# Patient Record
Sex: FEMALE | Employment: FULL TIME
[De-identification: names, ages, dates, MRNs, and addresses within clinical notes are randomized per-mention and may not be internally consistent; named-entity substitution may affect disease eponyms.]

---

## 2023-09-07 ENCOUNTER — TRANSCRIBE ORDERS (OUTPATIENT)
Dept: OTHER | Age: 43
End: 2023-09-07

## 2023-09-07 DIAGNOSIS — R42 DIZZINESS AND GIDDINESS: Primary | ICD-10-CM

## 2023-09-12 ENCOUNTER — THERAPY VISIT (OUTPATIENT)
Dept: OCCUPATIONAL THERAPY | Facility: REHABILITATION | Age: 43
End: 2023-09-12
Payer: COMMERCIAL

## 2023-09-12 DIAGNOSIS — R42 DIZZINESS: Primary | ICD-10-CM

## 2023-09-12 DIAGNOSIS — Z78.9 IMPAIRED INSTRUMENTAL ACTIVITIES OF DAILY LIVING: ICD-10-CM

## 2023-09-12 PROCEDURE — 97165 OT EVAL LOW COMPLEX 30 MIN: CPT | Mod: GO | Performed by: OCCUPATIONAL THERAPIST

## 2023-09-12 PROCEDURE — 97112 NEUROMUSCULAR REEDUCATION: CPT | Mod: GO | Performed by: OCCUPATIONAL THERAPIST

## 2023-09-12 NOTE — PROGRESS NOTES
"OCCUPATIONAL THERAPY EVALUATION  Type of Visit: Evaluation    See electronic medical record for Abuse and Falls Screening details.    Presenting condition or subjective complaint: Patient was referred to vestibular rehab by ENT provider due to dizziness. Symptoms began in early August after she arrived in [place] after an 8 hour flight.  Symptoms are not improving. She had normal hearing tests, no tinnitus, no nausea and no ear pain. She states that she can hear an echo in her ears when she is talking. Recently she began having headaches at the end of the day.   Date of onset: 08/02/23    Relevant medical history: Dizziness, KIMI  Dates & types of surgery: N/A    Prior diagnostic imaging/testing results:      None  Prior therapy history for the same diagnosis, illness or injury: No      Living Environment  Social support: With a significant other or spouse   Type of home: House   Stairs to enter the home: Yes 5 Is there a railing: Yes   Ramp: No   Stairs inside the home: Yes 11 Is there a railing: Yes   Help at home: None  Equipment owned:       Employment: Yes   Hobbies/Interests: Meditation, yoga, cooking    Patient goals for therapy: Recalibrate my system so it knows when I am not in motion    Pain assessment: Pain present, headache 6/10     Objective     VISUAL SKILLS  Visual Acuity: No deficits identified, Wears glasses    SENSATION: back of head feels numb and bridge of nose feels numb    FUNCTIONAL MOBILITY: \"feels like I'm on a boat\"       VESTIBULAR EVALUATION  ADDITIONAL HISTORY:  Description of symptoms: Off balance; Light-headedness  Dizzy attacks:   Start: Around August 2nd   Last attack: Happens daily   Frequency of occurrences: Almost constant   Length of attack: See above  Difficulty hearing:    Noise in ears? No    Alleviates symptoms: Staying calm  Worsens symptoms: Too much stimulation of the senses  Activities that bring on symptoms: Other  None of these make them better or " worse- it happens all of the time    Oculomotor Screen    Ocular ROM Normal   Smooth Pursuit Normal   Saccades Normal   VOR Abnormal   VOR Cancellation NT   Head Impulse Test NT   Convergence Testing NT        Infrared Goggle Exam Vestibular Suppressant in Last 24 Hours? No  Exam Completed With: Infrared goggles   Spontaneous Nystagmus Negative   Gaze Evoked Nystagmus Negative   Head Shake Horizontal Nystagmus Negative    Left Right   Organ-Hallpike NT NT\   Sidelying Test NT NT        Assessment & Plan   CLINICAL IMPRESSIONS  Medical Diagnosis: dizziness    Treatment Diagnosis: (P) dizziness, decreased ADL and IADL function    Impression/Assessment: Pt is a 42 year old female presenting to Occupational Therapy due to dizziness.  The following significant findings have been identified: Impaired activity tolerance.  These identified deficits interfere with their ability to perform self care tasks, work tasks, and recreational activities as compared to previous level of function.     Clinical Decision Making (Complexity):  Assessment of Occupational Performance: 3-5 Performance Deficits  Occupational Performance Limitations: functional mobility, driving and community mobility, home establishment and management, work, and leisure activities  Clinical Decision Making (Complexity): Low complexity    PLAN OF CARE  Treatment Interventions:  Interventions: Neuromuscular Re-education    Long Term Goals   OT Goal 1  Goal Description: (P) Patient will report improved sense of stability during ADL and IADL's  Target Date: (P) 12/05/23      Frequency of Treatment: (P) once a week  Duration of Treatment: (P) 12 weeks     Recommended Referrals to Other Professionals: No  Education Assessment:       Risks and benefits of evaluation/treatment have been explained.   Patient/Family agrees with Plan of Care.     Evaluation Time:    OT Eval, Low Complexity Minutes (10937): (P) 30       Signing Clinician: Luci Khan  OT

## 2023-10-12 ENCOUNTER — THERAPY VISIT (OUTPATIENT)
Dept: OCCUPATIONAL THERAPY | Facility: REHABILITATION | Age: 43
End: 2023-10-12
Payer: COMMERCIAL

## 2023-10-12 DIAGNOSIS — R42 DIZZINESS: Primary | ICD-10-CM

## 2023-10-12 DIAGNOSIS — Z78.9 IMPAIRED INSTRUMENTAL ACTIVITIES OF DAILY LIVING: ICD-10-CM

## 2023-10-12 PROCEDURE — 97112 NEUROMUSCULAR REEDUCATION: CPT | Mod: GO | Performed by: OCCUPATIONAL THERAPIST

## 2023-10-19 ENCOUNTER — TRANSCRIBE ORDERS (OUTPATIENT)
Dept: OTHER | Age: 43
End: 2023-10-19

## 2023-10-19 DIAGNOSIS — R42 DIZZINESS: Primary | ICD-10-CM

## 2023-10-22 ENCOUNTER — HEALTH MAINTENANCE LETTER (OUTPATIENT)
Age: 43
End: 2023-10-22

## 2023-10-23 ENCOUNTER — THERAPY VISIT (OUTPATIENT)
Dept: PHYSICAL THERAPY | Facility: REHABILITATION | Age: 43
End: 2023-10-23
Payer: COMMERCIAL

## 2023-10-23 DIAGNOSIS — R42 DIZZINESS: ICD-10-CM

## 2023-10-23 DIAGNOSIS — G44.219 EPISODIC TENSION-TYPE HEADACHE, NOT INTRACTABLE: ICD-10-CM

## 2023-10-23 DIAGNOSIS — M79.18 MYOFASCIAL PAIN: Primary | ICD-10-CM

## 2023-10-23 DIAGNOSIS — M54.2 CERVICAL PAIN: ICD-10-CM

## 2023-10-23 PROCEDURE — 97140 MANUAL THERAPY 1/> REGIONS: CPT | Mod: GP | Performed by: PHYSICAL THERAPIST

## 2023-10-23 PROCEDURE — 97161 PT EVAL LOW COMPLEX 20 MIN: CPT | Mod: GP | Performed by: PHYSICAL THERAPIST

## 2023-10-23 NOTE — PROGRESS NOTES
"PHYSICAL THERAPY EVALUATION  Type of Visit: Evaluation    See electronic medical record for Abuse and Falls Screening details.    Subjective       Presenting condition or subjective complaint: Pt comes to PT brant a feeling like she is riding on a boat up and down on 8/2/23. She also started noticing about a month ago having HA's starting. The HA\"s are mostly back of R side of the head and wraps around to the front. Sometimes it is both sides but always R side. She does feel like the sx are better when stepping away from everything after even 10-15\". Prior to the start of her sx she did do a long trip (8 hour flight). She does feel like the sx almost did start in her legs as well. After getting good sleep she would improve from past expericneces like that.  She has also had some emotional things going on with an injury to her . 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 She is getting HA's 1-3x/day and usually more in the afternoon. There is neck pain that she feels is tightness in her chest/shoulders. She did have a concussion about 7 years ago as well. She was able to improve sx with PT/chiro at that time.  Date of onset: 08/02/23    Relevant medical history: Concussions; Dizziness; Migraines or headaches; Neck injury   Dates & types of surgery:      Prior diagnostic imaging/testing results: MRI     Prior therapy history for the same diagnosis, illness or injury:        Living Environment  Social support: With a significant other or spouse   Type of home: House   Stairs to enter the home: Yes       Ramp:     Stairs inside the home: Yes       Help at home: None  Equipment owned:       Employment: Yes  in [employer].  Hobbies/Interests: meditation, yoga, being outside, reading, cooking    Patient goals for therapy: work with full capacity    Pain assessment: Pain present  Location: 0-8/10 for the headache    " 1-2 in neck at best, 7/10 at worst in neck     Objective   CERVICAL SPINE EVALUATION  PAIN:   INTEGUMENTARY (edema, incisions):   POSTURE: increased kyphosis, mild sway back posture, rounded shoulders  BALANCE/PROPRIOCEPTION:   WEIGHTBEARING ALIGNMENT:   ROM:   (Degrees) Left AROM Right AROM    Cervical Flexion 40 pulling    Cervical Extension 50 pulling    Cervical Side bend      Cervical Rotation 80 77    Cervical Protrusion     Cervical Retraction     Thoracic Flexion     Thoracic Extension     Thoracic Rotation 40 45     Left AROM Left PROM Right AROM Right PROM   Shoulder Flexion WFL  WFL    Shoulder Extension       Shoulder Abduction WFL  WFL    Shoulder Adduction       Shoulder IR       Shoulder ER       Shoulder Horiz Abduction       Shoulder Horiz Adduction       MYOTOMES:  All UE MMT are WFL  PALPATION:  decreased fascial mobility of spine, cranium. Increased tone in paraspinals      Assessment & Plan   CLINICAL IMPRESSIONS  Medical Diagnosis: dizziness    Treatment Diagnosis: dizziness, HA, neck pain, myofascial pain   Impression/Assessment: Patient is a 42 year old female with dizziness/headaches/neck pain complaints.  The following significant findings have been identified: Pain, Decreased ROM/flexibility, Decreased proprioception, Impaired sensation, and Decreased activity tolerance. These impairments interfere with their ability to perform work tasks and recreational activities as compared to previous level of function.     Clinical Decision Making (Complexity):  Clinical Presentation: Stable/Uncomplicated  Clinical Presentation Rationale: based on medical and personal factors listed in PT evaluation  Clinical Decision Making (Complexity): Low complexity    PLAN OF CARE  Treatment Interventions:  Interventions: Manual Therapy, Neuromuscular Re-education, Therapeutic Exercise    Long Term Goals     PT Goal 1  Goal Identifier: 1  Goal Description: Pt will improve NDI by 15 points to show improved  function in 90 days.  PT Goal 2  Goal Identifier: 2  Goal Description: Pt will increase C flex/ext to >60 deg and T-rot to >60 deg to improve spinal mobility in 90 days.  PT Goal 3  Goal Identifier: 3  Goal Description: Pt will verbalize no HA's during the work day in 90 days.      Frequency of Treatment: 1x/week  Duration of Treatment: 90 days    Recommended Referrals to Other Professionals:   Education Assessment:        Risks and benefits of evaluation/treatment have been explained.   Patient/Family/caregiver agrees with Plan of Care.     Evaluation Time:     PT Eval, Low Complexity Minutes (40655): 30       Signing Clinician: ALEX VICENTE, PT

## 2023-11-07 ENCOUNTER — THERAPY VISIT (OUTPATIENT)
Dept: OCCUPATIONAL THERAPY | Facility: REHABILITATION | Age: 43
End: 2023-11-07
Payer: COMMERCIAL

## 2023-11-07 ENCOUNTER — THERAPY VISIT (OUTPATIENT)
Dept: PHYSICAL THERAPY | Facility: REHABILITATION | Age: 43
End: 2023-11-07
Payer: COMMERCIAL

## 2023-11-07 DIAGNOSIS — G44.219 EPISODIC TENSION-TYPE HEADACHE, NOT INTRACTABLE: ICD-10-CM

## 2023-11-07 DIAGNOSIS — R42 DIZZINESS: ICD-10-CM

## 2023-11-07 DIAGNOSIS — M54.2 CERVICAL PAIN: ICD-10-CM

## 2023-11-07 DIAGNOSIS — R42 DIZZINESS: Primary | ICD-10-CM

## 2023-11-07 DIAGNOSIS — Z78.9 IMPAIRED INSTRUMENTAL ACTIVITIES OF DAILY LIVING: ICD-10-CM

## 2023-11-07 DIAGNOSIS — M79.18 MYOFASCIAL PAIN: Primary | ICD-10-CM

## 2023-11-07 PROCEDURE — 97112 NEUROMUSCULAR REEDUCATION: CPT | Mod: GO | Performed by: OCCUPATIONAL THERAPIST

## 2023-11-07 PROCEDURE — 97140 MANUAL THERAPY 1/> REGIONS: CPT | Mod: GP | Performed by: PHYSICAL THERAPIST

## 2023-11-20 ENCOUNTER — THERAPY VISIT (OUTPATIENT)
Dept: PHYSICAL THERAPY | Facility: REHABILITATION | Age: 43
End: 2023-11-20
Payer: COMMERCIAL

## 2023-11-20 DIAGNOSIS — M79.18 MYOFASCIAL PAIN: Primary | ICD-10-CM

## 2023-11-20 DIAGNOSIS — G44.219 EPISODIC TENSION-TYPE HEADACHE, NOT INTRACTABLE: ICD-10-CM

## 2023-11-20 DIAGNOSIS — M54.2 CERVICAL PAIN: ICD-10-CM

## 2023-11-20 DIAGNOSIS — R42 DIZZINESS: ICD-10-CM

## 2023-11-20 PROCEDURE — 97140 MANUAL THERAPY 1/> REGIONS: CPT | Mod: GP | Performed by: PHYSICAL THERAPIST

## 2023-12-19 ENCOUNTER — THERAPY VISIT (OUTPATIENT)
Dept: PHYSICAL THERAPY | Facility: REHABILITATION | Age: 43
End: 2023-12-19
Payer: COMMERCIAL

## 2023-12-19 DIAGNOSIS — M79.18 MYOFASCIAL PAIN: Primary | ICD-10-CM

## 2023-12-19 DIAGNOSIS — M54.2 CERVICAL PAIN: ICD-10-CM

## 2023-12-19 DIAGNOSIS — R42 DIZZINESS: ICD-10-CM

## 2023-12-19 DIAGNOSIS — G44.219 EPISODIC TENSION-TYPE HEADACHE, NOT INTRACTABLE: ICD-10-CM

## 2023-12-19 PROCEDURE — 97140 MANUAL THERAPY 1/> REGIONS: CPT | Mod: GP | Performed by: PHYSICAL THERAPIST

## 2023-12-22 NOTE — PROGRESS NOTES
"    DISCHARGE  Reason for Discharge: Progress plateau    Equipment Issued: none    Discharge Plan: Patient to continue home program.    Referring Provider:  Mary Montes De Oca PA-C       11/07/23 0500   Appointment Info   Treating Provider Merly Khan OTR/L   Visits Used 3   Medical Diagnosis dizziness   OT Tx Diagnosis dizziness, decreased ADL and IADL function   Progress Note/Certification   Onset of Illness/Injury or Date of Surgery 08/02/23   Therapy Frequency once a week   Predicted Duration 12 weeks   Progress Note Due Date 11/11/23   OT Goal 1   Goal Description Patient will report improved sense of stability during ADL and IADL's   Target Date 12/05/23   Subjective Report   Subjective Report Patient reports that symptoms are the same. She has seen PT for myofacial release. She had some relief of tension and headache after her PT appt.   Neuromuscular Re-education   Neuromuscular Re-ed Minutes (94205) 35   Neuro Re-ed 1 HEP   Neuro Re-ed 1 - Details Patient reports that the headaches in the back of her head and on the right side are about the same. She has had less nausea. She reports not having the \"echo\" with hearing. She has a history of numbness in face and scalp and this has been more frequent in past weeks. Patient also states that when she first wakes up, she is now having 1-2 hours without symptoms. Reviewed vestibular adaptation and substitution exercises today including horizontal X1 viewing-20 seconds, vertical X1 viewing-10 seconds, proprioceptive input (stomping 5 times or jumping in place 2-3 times). Patient will add striped background to horizontal VOR exercise and see if tolerated. Discontinued VOR suppression. Patient will perform 4-5 times a day.   Skilled Intervention vestibular adaptation and substitution exercises   Total Session Time   Timed Code Treatment Minutes 35   Total Treatment Time (sum of timed and untimed services) 35         "

## 2024-01-02 ENCOUNTER — THERAPY VISIT (OUTPATIENT)
Dept: PHYSICAL THERAPY | Facility: REHABILITATION | Age: 44
End: 2024-01-02
Payer: COMMERCIAL

## 2024-01-02 DIAGNOSIS — M79.18 MYOFASCIAL PAIN: Primary | ICD-10-CM

## 2024-01-02 DIAGNOSIS — G44.219 EPISODIC TENSION-TYPE HEADACHE, NOT INTRACTABLE: ICD-10-CM

## 2024-01-02 DIAGNOSIS — M54.2 CERVICAL PAIN: ICD-10-CM

## 2024-01-02 DIAGNOSIS — R42 DIZZINESS: ICD-10-CM

## 2024-01-02 PROCEDURE — 97140 MANUAL THERAPY 1/> REGIONS: CPT | Mod: GP | Performed by: PHYSICAL THERAPIST

## 2024-01-16 ENCOUNTER — THERAPY VISIT (OUTPATIENT)
Dept: PHYSICAL THERAPY | Facility: REHABILITATION | Age: 44
End: 2024-01-16
Payer: COMMERCIAL

## 2024-01-16 DIAGNOSIS — R42 DIZZINESS: ICD-10-CM

## 2024-01-16 DIAGNOSIS — M79.18 MYOFASCIAL PAIN: Primary | ICD-10-CM

## 2024-01-16 DIAGNOSIS — M54.2 CERVICAL PAIN: ICD-10-CM

## 2024-01-16 DIAGNOSIS — G44.219 EPISODIC TENSION-TYPE HEADACHE, NOT INTRACTABLE: ICD-10-CM

## 2024-01-16 PROCEDURE — 97140 MANUAL THERAPY 1/> REGIONS: CPT | Mod: GP | Performed by: PHYSICAL THERAPIST

## 2024-01-30 ENCOUNTER — THERAPY VISIT (OUTPATIENT)
Dept: PHYSICAL THERAPY | Facility: REHABILITATION | Age: 44
End: 2024-01-30
Payer: COMMERCIAL

## 2024-01-30 DIAGNOSIS — R42 DIZZINESS: ICD-10-CM

## 2024-01-30 DIAGNOSIS — M54.2 CERVICAL PAIN: ICD-10-CM

## 2024-01-30 DIAGNOSIS — G44.219 EPISODIC TENSION-TYPE HEADACHE, NOT INTRACTABLE: ICD-10-CM

## 2024-01-30 DIAGNOSIS — M79.18 MYOFASCIAL PAIN: Primary | ICD-10-CM

## 2024-01-30 PROCEDURE — 97140 MANUAL THERAPY 1/> REGIONS: CPT | Mod: GP | Performed by: PHYSICAL THERAPIST

## 2024-03-10 ENCOUNTER — HEALTH MAINTENANCE LETTER (OUTPATIENT)
Age: 44
End: 2024-03-10

## 2024-10-17 ENCOUNTER — TRANSCRIBE ORDERS (OUTPATIENT)
Dept: OTHER | Age: 44
End: 2024-10-17

## 2024-10-17 DIAGNOSIS — R42 DIZZINESS AND GIDDINESS: ICD-10-CM

## 2024-10-17 DIAGNOSIS — R42 DIZZINESS: Primary | ICD-10-CM

## 2024-12-15 ENCOUNTER — HEALTH MAINTENANCE LETTER (OUTPATIENT)
Age: 44
End: 2024-12-15